# Patient Record
Sex: FEMALE | Race: ASIAN | NOT HISPANIC OR LATINO | Employment: STUDENT | URBAN - METROPOLITAN AREA
[De-identification: names, ages, dates, MRNs, and addresses within clinical notes are randomized per-mention and may not be internally consistent; named-entity substitution may affect disease eponyms.]

---
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
## 2021-03-08 ENCOUNTER — HOSPITAL ENCOUNTER (OUTPATIENT)
Dept: LAB | Facility: MEDICAL CENTER | Age: 21
End: 2021-03-08
Attending: FAMILY MEDICINE
Payer: COMMERCIAL

## 2021-03-08 PROCEDURE — 84443 ASSAY THYROID STIM HORMONE: CPT

## 2021-03-08 PROCEDURE — 36415 COLL VENOUS BLD VENIPUNCTURE: CPT

## 2021-03-08 PROCEDURE — 84439 ASSAY OF FREE THYROXINE: CPT

## 2021-03-08 PROCEDURE — 84481 FREE ASSAY (FT-3): CPT

## 2021-03-08 PROCEDURE — 86800 THYROGLOBULIN ANTIBODY: CPT

## 2021-03-09 LAB
T3FREE SERPL-MCNC: 3.99 PG/ML (ref 2–4.4)
T4 FREE SERPL-MCNC: 1.39 NG/DL (ref 0.93–1.7)
TSH SERPL DL<=0.005 MIU/L-ACNC: 1.26 UIU/ML (ref 0.38–5.33)

## 2021-03-10 LAB — THYROGLOB AB SERPL-ACNC: <0.9 IU/ML (ref 0–4)

## 2022-01-06 ENCOUNTER — OFFICE VISIT (OUTPATIENT)
Dept: URGENT CARE | Facility: CLINIC | Age: 22
End: 2022-01-06
Payer: COMMERCIAL

## 2022-01-06 VITALS
HEART RATE: 88 BPM | SYSTOLIC BLOOD PRESSURE: 126 MMHG | BODY MASS INDEX: 18.65 KG/M2 | RESPIRATION RATE: 14 BRPM | WEIGHT: 95 LBS | OXYGEN SATURATION: 97 % | TEMPERATURE: 99 F | HEIGHT: 60 IN | DIASTOLIC BLOOD PRESSURE: 68 MMHG

## 2022-01-06 DIAGNOSIS — J06.9 UPPER RESPIRATORY TRACT INFECTION, UNSPECIFIED TYPE: ICD-10-CM

## 2022-01-06 DIAGNOSIS — Z71.89 EDUCATED ABOUT COVID-19 VIRUS INFECTION: ICD-10-CM

## 2022-01-06 DIAGNOSIS — J02.9 SORE THROAT: ICD-10-CM

## 2022-01-06 PROBLEM — J30.2 SEASONAL ALLERGIES: Status: ACTIVE | Noted: 2019-06-17

## 2022-01-06 LAB
INT CON NEG: NORMAL
INT CON POS: NORMAL
S PYO AG THROAT QL: NEGATIVE

## 2022-01-06 PROCEDURE — 99213 OFFICE O/P EST LOW 20 MIN: CPT | Performed by: PHYSICIAN ASSISTANT

## 2022-01-06 PROCEDURE — 87880 STREP A ASSAY W/OPTIC: CPT | Performed by: PHYSICIAN ASSISTANT

## 2022-01-06 RX ORDER — ALBUTEROL SULFATE 90 UG/1
AEROSOL, METERED RESPIRATORY (INHALATION)
COMMUNITY
Start: 2021-12-28

## 2022-01-06 RX ORDER — ETONOGESTREL AND ETHINYL ESTRADIOL VAGINAL RING .015; .12 MG/D; MG/D
RING VAGINAL
COMMUNITY
Start: 2021-11-18 | End: 2022-09-24

## 2022-01-06 ASSESSMENT — ENCOUNTER SYMPTOMS
SINUS PAIN: 0
CHILLS: 0
VOMITING: 0
SORE THROAT: 1
MYALGIAS: 0
SHORTNESS OF BREATH: 0
HEADACHES: 0
DIARRHEA: 0
NAUSEA: 0
SPUTUM PRODUCTION: 0
FEVER: 0
ABDOMINAL PAIN: 0
WHEEZING: 0
COUGH: 0

## 2022-01-06 NOTE — PROGRESS NOTES
Subjective:   Bianca Gilman is a 21 y.o. female who presents for Sore Throat (x 1 day, RT throat side swollen, sore throat, negative home covid test,)      HPI  21 y.o. female presents to urgent care with new problem to provider of sore throat onset this morning.  She reports she took a negative Covid test today.  She denies symptoms of cough, fever, congestion, body aches, or headaches.  No nausea vomiting or diarrhea.  No shortness of breath or chest pain.  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 Denies other associated aggravating or alleviating factors.     Review of Systems   Constitutional: Negative for chills, fever and malaise/fatigue.   HENT: Positive for sore throat. Negative for congestion, ear pain and sinus pain.    Respiratory: Negative for cough, sputum production, shortness of breath and wheezing.    Cardiovascular: Negative for chest pain.   Gastrointestinal: Negative for abdominal pain, diarrhea, nausea and vomiting.   Musculoskeletal: Negative for myalgias.   Neurological: Negative for headaches.       Patient Active Problem List   Diagnosis   • Seasonal allergies     History reviewed. No pertinent surgical history.  Social History     Tobacco Use   • Smoking status: Former Smoker     Packs/day: 0.00   • Smokeless tobacco: Never Used   • Tobacco comment: Not currently   Vaping Use   • Vaping Use: Every day   • Substances: Nicotine, CBD   Substance Use Topics   • Alcohol use: Not Currently     Alcohol/week: 0.0 oz   • Drug use: Not Currently      History reviewed. No pertinent family history.   (Allergies, Medications, & Tobacco/Substance Use were reconciled by the Medical Assistant and reviewed by myself. The family history is prepopulated)     Objective:     /68   Pulse 88   Temp 37.2 °C (99 °F) (Temporal)   Resp 14   Ht 1.524 m (5')   Wt 43.1 kg (95 lb)   LMP 12/22/2021 (Exact Date)   SpO2 97%   Breastfeeding No    BMI 18.55 kg/m²     Physical Exam  Vitals reviewed.   Constitutional:       General: She is not in acute distress.     Appearance: Normal appearance. She is not ill-appearing or diaphoretic.   HENT:      Head: Normocephalic and atraumatic.      Nose: Nose normal.      Mouth/Throat:      Mouth: Mucous membranes are moist.      Pharynx: No oropharyngeal exudate or posterior oropharyngeal erythema.   Eyes:      Conjunctiva/sclera: Conjunctivae normal.   Cardiovascular:      Rate and Rhythm: Normal rate and regular rhythm.      Heart sounds: Normal heart sounds. No murmur heard.  No friction rub. No gallop.    Pulmonary:      Effort: Pulmonary effort is normal. No respiratory distress.      Breath sounds: Normal breath sounds. No wheezing, rhonchi or rales.   Musculoskeletal:         General: Normal range of motion.      Cervical back: Normal range of motion and neck supple.   Lymphadenopathy:      Cervical: No cervical adenopathy.   Skin:     General: Skin is warm and dry.      Findings: No rash.   Neurological:      General: No focal deficit present.      Mental Status: She is alert and oriented to person, place, and time.   Psychiatric:         Mood and Affect: Mood normal.         Behavior: Behavior normal.         Thought Content: Thought content normal.         Judgment: Judgment normal.         Assessment/Plan:     1. Sore throat  POCT Rapid Strep A   2. Upper respiratory tract infection, unspecified type  COVID/SARS CoV-2 PCR   3. Educated about COVID-19 virus infection       Results for orders placed or performed in visit on 01/06/22   POCT Rapid Strep A   Result Value Ref Range    Rapid Strep Screen Negative     Internal Control Positive Valid     Internal Control Negative Valid        Patient will return for collection of COVID-19 swab in 2 days.  Discussed with patient that with her symptoms only starting this morning there is a high probability of a false negative Covid testing if collected today.  Patient  instructed to self-isolate/quarantine per CDC guidelines.  I will follow-up pending COVID-19 testing. Discussed with patient may obtain hard copy of results on [employer].   Advised patient symptoms are most likely viral in etiology. Increased fluids and rest. Discussed use of OTC cough and cold medication and Tylenol/Motrin for symptomatic relief.  Return for reevaluation or proceed to ED if symptoms persist or worsen. Supportive care, differential diagnoses, and indications for immediate follow-up discussed with patient. Patient should to proceed to ED for development of symptoms including but not limited to shortness of breath breath, difficulty breathing, or worsening symptoms not manageable at home.   Vital signs stable, patient in no acute respiratory distress.  COVID-19 discharge instructions and CDC guidelines provided to patient in AVS.      Differential diagnosis, natural history, supportive care, and indications for immediate follow-up discussed.    Advised the patient to follow-up with the primary care physician for recheck, reevaluation, and consideration of further management.  Patient verbalized understanding of treatment plan and has no further questions regarding care.   This patient is evaluated under [facility] isolation protocols in urgent care.  Out of an abundance of caution I am wearing a N95 mask, protective eye gear, and gloves through all interaction with patient.    I personally reviewed prior external notes and test results pertinent to today's visit.     Please note that this dictation was created using voice recognition software. I have made a reasonable attempt to correct obvious errors, but I expect that there are errors of grammar and possibly content that I did not discover before finalizing the note.    This note was electronically signed by Radha Valerio PA-C

## 2022-01-08 ENCOUNTER — HOSPITAL ENCOUNTER (OUTPATIENT)
Facility: MEDICAL CENTER | Age: 22
End: 2022-01-08
Attending: PHYSICIAN ASSISTANT
Payer: COMMERCIAL

## 2022-01-08 PROCEDURE — U0005 INFEC AGEN DETEC AMPLI PROBE: HCPCS

## 2022-01-08 PROCEDURE — U0003 INFECTIOUS AGENT DETECTION BY NUCLEIC ACID (DNA OR RNA); SEVERE ACUTE RESPIRATORY SYNDROME CORONAVIRUS 2 (SARS-COV-2) (CORONAVIRUS DISEASE [COVID-19]), AMPLIFIED PROBE TECHNIQUE, MAKING USE OF HIGH THROUGHPUT TECHNOLOGIES AS DESCRIBED BY CMS-2020-01-R: HCPCS

## 2022-01-09 DIAGNOSIS — J06.9 UPPER RESPIRATORY TRACT INFECTION, UNSPECIFIED TYPE: ICD-10-CM

## 2022-01-09 LAB — COVID ORDER STATUS COVID19: NORMAL

## 2022-01-10 LAB
SARS-COV-2 RNA RESP QL NAA+PROBE: NOTDETECTED
SPECIMEN SOURCE: NORMAL

## 2022-04-23 ENCOUNTER — OFFICE VISIT (OUTPATIENT)
Dept: URGENT CARE | Facility: CLINIC | Age: 22
End: 2022-04-23
Payer: COMMERCIAL

## 2022-04-23 VITALS
WEIGHT: 100 LBS | RESPIRATION RATE: 14 BRPM | TEMPERATURE: 99.4 F | OXYGEN SATURATION: 98 % | HEIGHT: 60 IN | DIASTOLIC BLOOD PRESSURE: 74 MMHG | HEART RATE: 106 BPM | BODY MASS INDEX: 19.63 KG/M2 | SYSTOLIC BLOOD PRESSURE: 114 MMHG

## 2022-04-23 DIAGNOSIS — J02.0 STREP PHARYNGITIS: ICD-10-CM

## 2022-04-23 LAB
INT CON NEG: NORMAL
INT CON POS: NORMAL
S PYO AG THROAT QL: POSITIVE

## 2022-04-23 PROCEDURE — 99213 OFFICE O/P EST LOW 20 MIN: CPT | Performed by: PHYSICIAN ASSISTANT

## 2022-04-23 PROCEDURE — 87880 STREP A ASSAY W/OPTIC: CPT | Performed by: PHYSICIAN ASSISTANT

## 2022-04-23 RX ORDER — AMOXICILLIN 500 MG/1
500 CAPSULE ORAL 2 TIMES DAILY
Qty: 20 CAPSULE | Refills: 0 | Status: SHIPPED | OUTPATIENT
Start: 2022-04-23 | End: 2022-05-03

## 2022-04-23 ASSESSMENT — ENCOUNTER SYMPTOMS
CHILLS: 0
FEVER: 1
EYE PAIN: 0
NAUSEA: 0
ABDOMINAL PAIN: 0
VOMITING: 0
CONSTIPATION: 0
SORE THROAT: 1
MYALGIAS: 0
SHORTNESS OF BREATH: 0
DIARRHEA: 0
HEADACHES: 0
COUGH: 0

## 2022-04-23 NOTE — LETTER
April 23, 2022    To Whom It May Concern:         This is confirmation that Bianca Gilman attended her scheduled appointment with Aleksander Banegas P.A.-C. on 4/23/22.  I have diagnosed this patient with strep throat.  She may return to work on Monday if she is feeling better, however if she continues to have significant symptoms she may benefit from Monday and possibly Tuesday off of work.         If you have any questions please do not hesitate to call me at the phone number listed below.    Sincerely,  Aleksander Banegas P.A.-C.  [phone]  (signed electronically)

## 2022-04-23 NOTE — PROGRESS NOTES
Subjective:   Bianca Gilman is a 21 y.o. female who presents for Pharyngitis (Pt has a sore throat, fever x 2 days )      21-year-old female presents with 2-day history of sore throat, mild tactile fever, pain with swallowing however is able to tolerate liquids and solids.  Has not noticed any neck stiffness.  Denies any cough, congestion or difficulty breathing      Review of Systems   Constitutional: Positive for fever and malaise/fatigue. Negative for chills.   HENT: Positive for sore throat. Negative for congestion and ear pain.    Eyes: Negative for pain.   Respiratory: Negative for cough and shortness of breath.    Cardiovascular: Negative for chest pain.   Gastrointestinal: Negative for abdominal pain, constipation, diarrhea, nausea and vomiting.   Genitourinary: Negative for dysuria.   Musculoskeletal: Negative for myalgias.   Skin: Negative for rash.   Neurological: Negative for headaches.       Medications, Allergies, and current problem list reviewed today in Epic.     Objective:     /74 (BP Location: Right arm, Patient Position: Sitting, BP Cuff Size: Small adult)   Pulse (!) 106   Temp 37.4 °C (99.4 °F) (Temporal)   Resp 14   Ht 1.524 m (5')   Wt 45.4 kg (100 lb)   SpO2 98%     Physical Exam  Vitals reviewed.   Constitutional:       Appearance: Normal appearance. She is not ill-appearing.   HENT:      Head: Normocephalic and atraumatic.      Right Ear: Tympanic membrane, ear canal and external ear normal.      Left Ear: Tympanic membrane, ear canal and external ear normal.      Nose: Nose normal.      Mouth/Throat:      Mouth: Mucous membranes are moist.      Pharynx: No oropharyngeal exudate.      Comments: Mild posterior pharyngeal erythema with palatal petechiae.  Midline uvula.  Patent airway.  Eyes:      Conjunctiva/sclera: Conjunctivae normal.   Cardiovascular:      Rate and Rhythm: Normal rate.   Pulmonary:      Effort: Pulmonary effort is normal.   Skin:     General: Skin is warm  and dry.      Capillary Refill: Capillary refill takes less than 2 seconds.   Neurological:      Mental Status: She is alert and oriented to person, place, and time.         Assessment/Plan:     Diagnosis and associated orders:     1. Strep pharyngitis  amoxicillin (AMOXIL) 500 MG Cap    POCT Rapid Strep A      Comments/MDM:     • Rapid strep positive  • No sign of serious or systemic infection.  Reviewed allergies.  Management includes completion of antibiotics, new toothbrush after day 3 of antibiotics, discarding/sanitizing any other dental equipment, soft foods, increased fluids, remain home from school for 24 hours.   Management of symptoms is discussed and expected course is outlined.   Patient instructed to return to office in 24-48 hours if not improving  • Patient instructed to present to Emergency Room if notes unilateral swelling, muffled voice, difficulty handling secretions           Differential diagnosis, natural history, supportive care, and indications for immediate follow-up discussed.    Advised the patient to follow-up with the primary care physician for recheck, reevaluation, and consideration of further management.    Please note that this dictation was created using voice recognition software. I have made a reasonable attempt to correct obvious errors, but I expect that there are errors of grammar and possibly content that I did not discover before finalizing the note.    This note was electronically signed by Aleksander Banegas PA-C

## 2022-04-25 ENCOUNTER — TELEPHONE (OUTPATIENT)
Dept: URGENT CARE | Facility: PHYSICIAN GROUP | Age: 22
End: 2022-04-25
Payer: COMMERCIAL

## 2022-04-25 NOTE — TELEPHONE ENCOUNTER
Patient called concerned as on my chart her strep was listed as negative.  I am quite confident that this represents a clerical error and was accidentally put in negative when it was in fact positive as I confirm that positive result in clinic.  She notes that she is improved on antibiotics.  I offered her to be retested however in her case I think this simply represents a clerical error.  Encouraged to return or call with any questions.    Aleksander Banegas P.A.-C.

## 2022-04-30 ENCOUNTER — TELEPHONE (OUTPATIENT)
Dept: URGENT CARE | Facility: CLINIC | Age: 22
End: 2022-04-30
Payer: COMMERCIAL

## 2022-04-30 DIAGNOSIS — T36.0X5A AMOXICILLIN RASH: ICD-10-CM

## 2022-04-30 DIAGNOSIS — L27.0 AMOXICILLIN RASH: ICD-10-CM

## 2022-04-30 RX ORDER — PREDNISONE 10 MG/1
40 TABLET ORAL DAILY
Qty: 20 TABLET | Refills: 0 | Status: SHIPPED | OUTPATIENT
Start: 2022-04-30 | End: 2022-05-05

## 2022-04-30 RX ORDER — AZITHROMYCIN 250 MG/1
TABLET, FILM COATED ORAL
Qty: 6 TABLET | Refills: 0 | Status: SHIPPED | OUTPATIENT
Start: 2022-04-30 | End: 2022-09-24

## 2022-04-30 NOTE — TELEPHONE ENCOUNTER
PT call 7 days on amoxacillin, is having allergic reaction/hives. Pt has 3 days left, pt would like to know if she should take another antibx instead. Pt is active on mychart. Informed pt prescribing provider or another provider would reach back out to her either via mychart or phone call. Informed pt she can try benadryl for allergies and that she can follow up for care of reaction. Pt states no SOB.     Update: 4/20/22: At the medical assistant call the patient for an update.  She continues to have red hives they are not super itchy they began last night after around 6 days of amoxicillin and she notes that her sore throat is improved but not fully gone.  She has been taking Benadryl.  She not having any difficulty breathing.  I have recommended based on this course of prednisone, continue Benadryl as needed and I will send the prednisone and a short course of azithromycin to [facility] on [facility] and Pending sale to [facility].  If she has new symptoms or issues I recommend in person evaluation.    1. Amoxicillin rash  - predniSONE (DELTASONE) 10 MG Tab; Take 4 Tablets by mouth every day for 5 days.  Dispense: 20 Tablet; Refill: 0  - azithromycin (ZITHROMAX) 250 MG Tab; Take 2 tablets by mouth on day one. Take one tablet by mouth the remaining days until gone  Dispense: 6 Tablet; Refill: 0    Aleksander Banegas P.A.-C.

## 2022-09-24 ENCOUNTER — OFFICE VISIT (OUTPATIENT)
Dept: URGENT CARE | Facility: CLINIC | Age: 22
End: 2022-09-24
Payer: COMMERCIAL

## 2022-09-24 VITALS
TEMPERATURE: 98.5 F | WEIGHT: 99.4 LBS | SYSTOLIC BLOOD PRESSURE: 106 MMHG | RESPIRATION RATE: 16 BRPM | BODY MASS INDEX: 19.52 KG/M2 | HEIGHT: 60 IN | OXYGEN SATURATION: 97 % | HEART RATE: 101 BPM | DIASTOLIC BLOOD PRESSURE: 70 MMHG

## 2022-09-24 DIAGNOSIS — J02.0 PHARYNGITIS DUE TO STREPTOCOCCUS SPECIES: ICD-10-CM

## 2022-09-24 LAB
INT CON NEG: NORMAL
INT CON POS: NORMAL
S PYO AG THROAT QL: POSITIVE

## 2022-09-24 PROCEDURE — 87880 STREP A ASSAY W/OPTIC: CPT | Performed by: STUDENT IN AN ORGANIZED HEALTH CARE EDUCATION/TRAINING PROGRAM

## 2022-09-24 PROCEDURE — 99213 OFFICE O/P EST LOW 20 MIN: CPT | Performed by: STUDENT IN AN ORGANIZED HEALTH CARE EDUCATION/TRAINING PROGRAM

## 2022-09-24 RX ORDER — AZITHROMYCIN 500 MG/1
500 TABLET, FILM COATED ORAL DAILY
Qty: 5 TABLET | Refills: 0 | Status: SHIPPED | OUTPATIENT
Start: 2022-09-24 | End: 2022-09-29

## 2022-09-24 ASSESSMENT — ENCOUNTER SYMPTOMS
NECK PAIN: 0
COUGH: 1
DIARRHEA: 0
WHEEZING: 0
STRIDOR: 0
ABDOMINAL PAIN: 0
EYE PAIN: 0
CHILLS: 0
HEMOPTYSIS: 0
FEVER: 1
SPUTUM PRODUCTION: 0
HOARSE VOICE: 0
SORE THROAT: 1
CONSTIPATION: 0
EYE REDNESS: 0
NAUSEA: 0
SHORTNESS OF BREATH: 0
PALPITATIONS: 0
SWOLLEN GLANDS: 0
EYE DISCHARGE: 0
HEADACHES: 0
VOMITING: 0
TROUBLE SWALLOWING: 0

## 2022-09-24 NOTE — PROGRESS NOTES
Subjective     Bianca Gilman is a 22 y.o. female who presents with Pharyngitis (Fever x 2 weeks /Cough x 4 days )            Pharyngitis   This is a new problem. The current episode started 1 to 4 weeks ago. The problem has been unchanged. The maximum temperature recorded prior to her arrival was 100.4 - 100.9 F. The fever has been present for 1 to 2 days. The pain is mild. Associated symptoms include congestion and coughing. Pertinent negatives include no abdominal pain, diarrhea, drooling, ear pain, headaches, hoarse voice, neck pain, shortness of breath, stridor, swollen glands, trouble swallowing or vomiting.     Review of Systems   Constitutional:  Positive for fever. Negative for chills and malaise/fatigue.   HENT:  Positive for congestion and sore throat. Negative for drooling, ear pain, hoarse voice and trouble swallowing.    Eyes:  Negative for pain, discharge and redness.   Respiratory:  Positive for cough. Negative for hemoptysis, sputum production, shortness of breath, wheezing and stridor.    Cardiovascular:  Negative for chest pain and palpitations.   Gastrointestinal:  Negative for abdominal pain, constipation, diarrhea, nausea and vomiting.   Musculoskeletal:  Negative for neck pain.   Neurological:  Negative for headaches.            Objective     /70   Pulse (!) 101   Temp 36.9 °C (98.5 °F) (Temporal)   Resp 16   Ht 1.524 m (5')   Wt 45.1 kg (99 lb 6.4 oz)   SpO2 97%   BMI 19.41 kg/m²      Physical Exam  Vitals reviewed.   Constitutional:       Appearance: Normal appearance.   HENT:      Head: Normocephalic and atraumatic.      Nose: Congestion present.      Mouth/Throat:      Lips: Pink.      Mouth: Mucous membranes are moist.      Pharynx: Oropharynx is clear. Uvula midline. Posterior oropharyngeal erythema present. No pharyngeal swelling, oropharyngeal exudate or uvula swelling.      Tonsils: No tonsillar exudate.   Eyes:      Extraocular Movements: Extraocular movements intact.       Conjunctiva/sclera: Conjunctivae normal.      Pupils: Pupils are equal, round, and reactive to light.   Cardiovascular:      Rate and Rhythm: Normal rate and regular rhythm.   Pulmonary:      Effort: Pulmonary effort is normal.   Musculoskeletal:      Cervical back: Normal range of motion.   Lymphadenopathy:      Cervical: No cervical adenopathy.   Skin:     General: Skin is warm and dry.   Neurological:      General: No focal deficit present.      Mental Status: She is alert. Mental status is at baseline.                           Assessment & Plan          1. Pharyngitis due to Streptococcus species  - azithromycin (ZITHROMAX) 500 MG tablet; Take 1 Tablet by mouth every day for 5 days.  Dispense: 5 Tablet; Refill: 0  - POCT Rapid Strep A : POSITIVE in clinic today.    Patient with allergy to amoxicillin.  Patient to start azithromycin 1 tablet by mouth every day for the next 5 days.  Patient advised to: Complete all antibiotics even if feeling better. Take OTC medicines for pain, discomfort and or fever, as directed in office today.  Advise close contacts that have a fever, sore throat or illness symptoms to seek medical care to get tested for strep throat.      Supportive care and indications for immediate follow-up discussed with patient. Pathogenesis of diagnosis discussed including typical length and natural progression.      Instructed to return to urgent care or nearest emergency department if symptoms fail to improve, for any change in condition, further concerns, or new concerning symptoms.    Patient states understanding and agrees with the plan of care and discharge instructions.

## 2022-11-01 ENCOUNTER — OFFICE VISIT (OUTPATIENT)
Dept: URGENT CARE | Facility: CLINIC | Age: 22
End: 2022-11-01
Payer: COMMERCIAL

## 2022-11-01 VITALS
HEIGHT: 60 IN | WEIGHT: 105 LBS | DIASTOLIC BLOOD PRESSURE: 56 MMHG | OXYGEN SATURATION: 94 % | RESPIRATION RATE: 14 BRPM | HEART RATE: 127 BPM | BODY MASS INDEX: 20.62 KG/M2 | SYSTOLIC BLOOD PRESSURE: 108 MMHG | TEMPERATURE: 99.5 F

## 2022-11-01 DIAGNOSIS — J02.9 SORE THROAT: ICD-10-CM

## 2022-11-01 DIAGNOSIS — R00.0 TACHYCARDIA: ICD-10-CM

## 2022-11-01 DIAGNOSIS — Z03.818 ENCOUNTER FOR PATIENT CONCERN ABOUT EXPOSURE TO INFECTIOUS ORGANISM: ICD-10-CM

## 2022-11-01 LAB
EXTERNAL QUALITY CONTROL: NORMAL
INT CON NEG: NORMAL
INT CON NEG: NORMAL
INT CON POS: NORMAL
INT CON POS: NORMAL
S PYO AG THROAT QL: NEGATIVE
SARS-COV+SARS-COV-2 AG RESP QL IA.RAPID: NEGATIVE

## 2022-11-01 PROCEDURE — 87426 SARSCOV CORONAVIRUS AG IA: CPT | Performed by: FAMILY MEDICINE

## 2022-11-01 PROCEDURE — 99213 OFFICE O/P EST LOW 20 MIN: CPT | Mod: CS | Performed by: FAMILY MEDICINE

## 2022-11-01 PROCEDURE — 87880 STREP A ASSAY W/OPTIC: CPT | Performed by: FAMILY MEDICINE

## 2022-11-01 RX ORDER — TOPIRAMATE 25 MG/1
25 TABLET ORAL 2 TIMES DAILY
COMMUNITY

## 2022-11-01 NOTE — PROGRESS NOTES
Subjective:      22 y.o. female presents to urgent care for cold symptoms that started Saturday.  She is experiencing sore throat, congestion, fever, headache, and body aches.  No diarrhea or cough.  She is tachycardic here in the urgent care, she denies any chest pain, palpitations, shortness of breath.  She has not been using any medication for symptoms.  She denies any tobacco product use.  No history of asthma or COPD.  She is fully vaccinated against COVID.  A coworker recently tested positive for strep throat.    She denies any other questions or concerns at this time.    Current problem list, medication, and past medical/surgical history were reviewed in Epic.    ROS  See HPI     Objective:      /56   Pulse (!) 127   Temp 37.5 °C (99.5 °F) (Temporal)   Resp 14   Ht 1.524 m (5')   Wt 47.6 kg (105 lb)   SpO2 94%   BMI 20.51 kg/m²     Physical Exam  Constitutional:       General: She is not in acute distress.     Appearance: She is not diaphoretic.   HENT:      Right Ear: Tympanic membrane, ear canal and external ear normal.      Left Ear: Tympanic membrane, ear canal and external ear normal.      Mouth/Throat:      Tongue: Tongue does not deviate from midline.      Palate: No lesions.      Pharynx: Uvula midline. No posterior oropharyngeal erythema.      Tonsils: No tonsillar exudate. 1+ on the right. 1+ on the left.   Cardiovascular:      Rate and Rhythm: Regular rhythm. Tachycardia present.      Heart sounds: Normal heart sounds.   Pulmonary:      Effort: Pulmonary effort is normal. No respiratory distress.      Breath sounds: Normal breath sounds.   Neurological:      Mental Status: She is alert.   Psychiatric:         Mood and Affect: Affect normal.         Judgment: Judgment normal.     Assessment/Plan:     1. Encounter for patient concern about exposure to infectious organism  2. Sore throat  3. Tachycardia  Systemic symptoms seen through tachycardia.  This is asymptomatic.  Follow-up with  PCP.  Negative COVID.  Negative strep.  Most likely viral etiology.  Tylenol, ibuprofen, rest, hydration as needed for symptomatic relief.  - POCT Rapid Strep A  - POCT SARS-COV Antigen SANDR[facility] Manual Result      Instructed to return to Urgent Care or nearest Emergency Department if symptoms fail to improve, for any change in condition, further concerns, or new concerning symptoms. Patient states understanding of the plan of care and discharge instructions.    Lori Juarez M.D.

## 2022-11-28 ENCOUNTER — TELEPHONE (OUTPATIENT)
Dept: OBGYN | Facility: CLINIC | Age: 22
End: 2022-11-28
Payer: COMMERCIAL

## 2022-11-28 NOTE — TELEPHONE ENCOUNTER
Pt called in requesting a refill for Nuvaring Rx. Pt has not been seen yet in our clinic, however is a pt of 's from [facility]. Explained to the patient that she needs to establish with one of our providers here in clinic. Pt informed that I would send a message to  to request that Rx be refilled until she is able to get in for an appointment. Pt agreeable to terms, and sent to scheduling for appointment, message sent to . no further questions.

## 2022-12-07 RX ORDER — ETONOGESTREL AND ETHINYL ESTRADIOL VAGINAL RING .015; .12 MG/D; MG/D
1 RING VAGINAL
Qty: 3 EACH | Refills: 3 | Status: SHIPPED | OUTPATIENT
Start: 2022-12-07

## 2022-12-21 ENCOUNTER — TELEPHONE (OUTPATIENT)
Dept: OBGYN | Facility: CLINIC | Age: 22
End: 2022-12-21

## 2022-12-21 ENCOUNTER — APPOINTMENT (OUTPATIENT)
Dept: OBGYN | Facility: CLINIC | Age: 22
End: 2022-12-21
Payer: COMMERCIAL

## 2022-12-21 NOTE — TELEPHONE ENCOUNTER
LVM to let pt know that appt today on 12/21 @ 8:30 has been cancelled and needs to be rescheduled due to provider in  meeting at this time.

## 2023-02-09 ENCOUNTER — OFFICE VISIT (OUTPATIENT)
Dept: URGENT CARE | Facility: CLINIC | Age: 23
End: 2023-02-09
Payer: COMMERCIAL

## 2023-02-09 VITALS
TEMPERATURE: 100.1 F | WEIGHT: 105 LBS | BODY MASS INDEX: 20.62 KG/M2 | HEIGHT: 60 IN | DIASTOLIC BLOOD PRESSURE: 70 MMHG | OXYGEN SATURATION: 97 % | SYSTOLIC BLOOD PRESSURE: 112 MMHG | RESPIRATION RATE: 18 BRPM | HEART RATE: 114 BPM

## 2023-02-09 DIAGNOSIS — J02.9 PHARYNGITIS, UNSPECIFIED ETIOLOGY: Primary | ICD-10-CM

## 2023-02-09 LAB — S PYO DNA SPEC NAA+PROBE: NOT DETECTED

## 2023-02-09 PROCEDURE — 87651 STREP A DNA AMP PROBE: CPT | Performed by: PHYSICIAN ASSISTANT

## 2023-02-09 PROCEDURE — 99213 OFFICE O/P EST LOW 20 MIN: CPT | Performed by: PHYSICIAN ASSISTANT

## 2023-02-09 NOTE — PROGRESS NOTES
Subjective:   Bianca Gilman is a 22 y.o. female who presents for Sore Throat (X 1 day, sore throat, low grade fever, body ache.)      HPI  The patient presents to the Urgent Care with complaints of sore throat onset last night.  Reports of low-grade fever of 100.  Associated body aches.  Hurts to swallow but handling oral secretions and tolerating fluids well. Denies any cough, chest pain, shortness of breath, vomiting, diarrhea.  Works with toddlers.      Medications:    albuterol Aers  BIOTIN PO  COQ10 PO  ethinyl estradiol-etonogestrel  FLONASE NA  topiramate Tabs  VITAMIN A PO  ZYRTEC ALLERGY PO    Allergies: Shellfish allergy and Amoxicillin    Problem List: Bianca Gilman does not have any pertinent problems on file.    Surgical History:  No past surgical history on file.    Past Social Hx: Bianca Gilman  reports that she has quit smoking. Her smoking use included cigarettes. She has never used smokeless tobacco. She reports current alcohol use. She reports that she does not currently use drugs.     Past Family Hx:  Bianca Gilman family history is not on file.     Problem list, medications, and allergies reviewed by myself today in Epic.     Objective:     /70   Pulse (!) 114   Temp 37.8 °C (100.1 °F) (Temporal)   Resp 18   Ht 1.524 m (5')   Wt 47.6 kg (105 lb)   SpO2 97%   BMI 20.51 kg/m²     Physical Exam  Vitals reviewed.   Constitutional:       General: She is not in acute distress.     Appearance: Normal appearance. She is not ill-appearing or toxic-appearing.   HENT:      Mouth/Throat:      Mouth: Mucous membranes are moist.      Pharynx: Uvula midline. Pharyngeal swelling and posterior oropharyngeal erythema present. No oropharyngeal exudate or uvula swelling.      Tonsils: No tonsillar exudate or tonsillar abscesses.   Eyes:      Conjunctiva/sclera: Conjunctivae normal.      Pupils: Pupils are equal, round, and reactive to light.   Cardiovascular:      Rate and Rhythm:  Normal rate and regular rhythm.      Heart sounds: Normal heart sounds.   Pulmonary:      Effort: Pulmonary effort is normal. No respiratory distress.      Breath sounds: Normal breath sounds. No wheezing, rhonchi or rales.   Musculoskeletal:      Cervical back: Neck supple. No rigidity.   Lymphadenopathy:      Cervical: Cervical adenopathy present.      Right cervical: Superficial cervical adenopathy present.      Left cervical: Superficial cervical adenopathy present.   Skin:     General: Skin is warm and dry.   Neurological:      General: No focal deficit present.      Mental Status: She is alert and oriented to person, place, and time.   Psychiatric:         Mood and Affect: Mood normal.         Behavior: Behavior normal.     Strep A: Negative      Diagnosis and associated orders:     1. Pharyngitis, unspecified etiology  - POCT CEPHEID GROUP A STREP - PCR       Comments/MDM:     Most likely viral etiology.   Recommend warm salt water gargles, soft foods, cool liquids, ibuprofen and Tylenol for any pain or fevers.   Return to the urgent care if symptoms are not improving or any worsening symptoms or concerns.      I personally reviewed prior external notes and test results pertinent to today's visit. Pathogenesis of diagnosis discussed including typical length and natural progression. Supportive care, natural history, differential diagnoses, and indications for immediate follow-up discussed. Any testing ordered for today's visit was discussed with patient and was agreed upon myself and the patient. Patient expresses understanding and agrees to plan. Patient denies any other questions or concerns.     Follow-up with the primary care physician for recheck, reevaluation, and consideration of further management.    Please note that this dictation was created using voice recognition software. I have made a reasonable attempt to correct obvious errors, but I expect that there are errors of grammar and possibly content  that I did not discover before finalizing the note.    This note was electronically signed by Loco Giraldo PA-C

## 2025-01-21 ENCOUNTER — OFFICE VISIT (OUTPATIENT)
Dept: URGENT CARE | Facility: CLINIC | Age: 25
End: 2025-01-21
Payer: COMMERCIAL

## 2025-01-21 VITALS
RESPIRATION RATE: 14 BRPM | OXYGEN SATURATION: 97 % | HEART RATE: 138 BPM | DIASTOLIC BLOOD PRESSURE: 66 MMHG | HEIGHT: 60 IN | TEMPERATURE: 97.7 F | WEIGHT: 110 LBS | BODY MASS INDEX: 21.6 KG/M2 | SYSTOLIC BLOOD PRESSURE: 96 MMHG

## 2025-01-21 DIAGNOSIS — J10.1 INFLUENZA A: ICD-10-CM

## 2025-01-21 DIAGNOSIS — J06.9 VIRAL URI WITH COUGH: ICD-10-CM

## 2025-01-21 LAB
FLUAV RNA SPEC QL NAA+PROBE: POSITIVE
FLUBV RNA SPEC QL NAA+PROBE: NEGATIVE
RSV RNA SPEC QL NAA+PROBE: NEGATIVE
SARS-COV-2 RNA RESP QL NAA+PROBE: NEGATIVE

## 2025-01-21 PROCEDURE — 0241U POCT CEPHEID COV-2, FLU A/B, RSV - PCR: CPT | Performed by: STUDENT IN AN ORGANIZED HEALTH CARE EDUCATION/TRAINING PROGRAM

## 2025-01-21 PROCEDURE — 3074F SYST BP LT 130 MM HG: CPT | Performed by: STUDENT IN AN ORGANIZED HEALTH CARE EDUCATION/TRAINING PROGRAM

## 2025-01-21 PROCEDURE — 99214 OFFICE O/P EST MOD 30 MIN: CPT | Performed by: STUDENT IN AN ORGANIZED HEALTH CARE EDUCATION/TRAINING PROGRAM

## 2025-01-21 PROCEDURE — 3078F DIAST BP <80 MM HG: CPT | Performed by: STUDENT IN AN ORGANIZED HEALTH CARE EDUCATION/TRAINING PROGRAM

## 2025-01-21 ASSESSMENT — ENCOUNTER SYMPTOMS
FEVER: 1
CHILLS: 1
SORE THROAT: 0
PALPITATIONS: 0
MYALGIAS: 1
COUGH: 1
SHORTNESS OF BREATH: 0
WHEEZING: 0

## 2025-01-21 NOTE — LETTER
January 21, 2025    To Whom It May Concern:         This is confirmation that Bianca Gilman attended her scheduled appointment with Lisa Anderson P.A.-C. on 1/21/25. Please excuse work absences through 1/24/25 for medical reasons. Bianca can return to work without restrictions on 1/25/25 or earlier as long as symptoms have improved/resolved and there has been no fever for 24 hours.          Sincerely,    Lisa Anderson P.A.-C.  [phone]

## 2025-01-21 NOTE — PROGRESS NOTES
Subjective     Bianca Gilman is a 24 y.o. female who presents with Fever (X 1 day)            URI   This is a new problem. The current episode started yesterday. Associated symptoms include congestion and coughing. Pertinent negatives include no chest pain, ear pain, sore throat or wheezing.       Review of Systems   Constitutional:  Positive for chills, fever and malaise/fatigue.   HENT:  Positive for congestion. Negative for ear pain and sore throat.    Respiratory:  Positive for cough. Negative for shortness of breath and wheezing.    Cardiovascular:  Negative for chest pain and palpitations.   Musculoskeletal:  Positive for myalgias.   All other systems reviewed and are negative.             Objective     BP 96/66 (BP Location: Left arm, Patient Position: Sitting, BP Cuff Size: Small adult)   Pulse (!) 138   Temp 36.5 °C (97.7 °F) (Temporal)   Resp 14   Ht 1.524 m (5')   Wt 49.9 kg (110 lb)   SpO2 97%   BMI 21.48 kg/m²      Physical Exam  Vitals reviewed.   Constitutional:       General: She is not in acute distress.     Appearance: Normal appearance. She is not toxic-appearing.   HENT:      Head: Normocephalic and atraumatic.      Right Ear: Tympanic membrane, ear canal and external ear normal.      Left Ear: Tympanic membrane, ear canal and external ear normal.      Nose: Nose normal.      Mouth/Throat:      Lips: Pink.      Mouth: Mucous membranes are moist.      Pharynx: Oropharynx is clear. Uvula midline.   Eyes:      Extraocular Movements: Extraocular movements intact.      Conjunctiva/sclera: Conjunctivae normal.      Pupils: Pupils are equal, round, and reactive to light.   Cardiovascular:      Rate and Rhythm: Tachycardia present.   Pulmonary:      Effort: Pulmonary effort is normal. No respiratory distress.      Breath sounds: Normal breath sounds. No stridor. No wheezing, rhonchi or rales.   Skin:     General: Skin is warm and dry.   Neurological:      General: No focal deficit present.       Mental Status: She is alert and oriented to person, place, and time.                             Assessment & Plan        Assessment & Plan  Viral URI with cough  - Acute illness with systemic symptoms of fever and tachycardia.  Afebrile in clinic although patient does report fever.  Tachycardic in clinic at 138 bpm.   - Symptom onset yesterday.  Day #2 of symptoms.  POCT viral collected and patient advised that results will be available via [employer].  -Work note provided.    Orders:    POCT CoV-2, Flu A/B, RSV by PCR           Differential diagnoses, supportive care measures (rest, hydration, OTC cold/flu medications, nasal saline rinses, humidified air) and indications for immediate follow-up discussed with patient. Pathogenesis of diagnosis discussed including typical length and natural progression.      Instructed to return to urgent care or nearest emergency department if symptoms fail to improve, for any change in condition, further concerns, or new concerning symptoms.    Patient states understanding and agrees with the plan of care and discharge instructions.